# Patient Record
Sex: FEMALE | Race: WHITE | Employment: FULL TIME | URBAN - METROPOLITAN AREA
[De-identification: names, ages, dates, MRNs, and addresses within clinical notes are randomized per-mention and may not be internally consistent; named-entity substitution may affect disease eponyms.]

---

## 2017-01-17 DIAGNOSIS — M47.812 CERVICAL SPONDYLOSIS WITHOUT MYELOPATHY: ICD-10-CM

## 2017-01-17 DIAGNOSIS — G43.109 MIGRAINE WITH AURA AND WITHOUT STATUS MIGRAINOSUS, NOT INTRACTABLE: ICD-10-CM

## 2017-01-17 RX ORDER — RIZATRIPTAN BENZOATE 10 MG/1
10 TABLET ORAL
Qty: 36 TAB | Refills: 4 | Status: SHIPPED | OUTPATIENT
Start: 2017-01-17 | End: 2018-01-23 | Stop reason: SDUPTHER

## 2017-01-17 NOTE — TELEPHONE ENCOUNTER
Future Appointments  Date Time Provider Sergey Shipley   1/20/2017 9:40 AM Elliot Colby MD [address]                         Last Appointment My Department:  10/22/2015    Please advise of refill below. Requested Prescriptions     Pending Prescriptions Disp Refills    rizatriptan (MAXALT) 10 mg tablet 36 Tab 4     Sig: Take 1 Tab by mouth every eight (8) hours as needed for Migraine.

## 2017-01-17 NOTE — TELEPHONE ENCOUNTER
Patient would like refills for maxalt please send to [facility]

## 2017-01-20 ENCOUNTER — OFFICE VISIT (OUTPATIENT)
Dept: NEUROLOGY | Age: 47
End: 2017-01-20

## 2017-01-20 VITALS
BODY MASS INDEX: 27.16 KG/M2 | OXYGEN SATURATION: 96 % | WEIGHT: 163 LBS | HEIGHT: 65 IN | SYSTOLIC BLOOD PRESSURE: 108 MMHG | HEART RATE: 60 BPM | DIASTOLIC BLOOD PRESSURE: 62 MMHG | RESPIRATION RATE: 16 BRPM

## 2017-01-20 DIAGNOSIS — G43.109 MIGRAINE WITH AURA AND WITHOUT STATUS MIGRAINOSUS, NOT INTRACTABLE: Primary | ICD-10-CM

## 2017-01-20 DIAGNOSIS — M47.812 CERVICAL SPONDYLOSIS WITHOUT MYELOPATHY: ICD-10-CM

## 2017-01-20 RX ORDER — CHOLECALCIFEROL (VITAMIN D3) 125 MCG
CAPSULE ORAL
COMMUNITY

## 2017-01-20 RX ORDER — MINERAL OIL
180 ENEMA (ML) RECTAL
COMMUNITY

## 2017-01-20 RX ORDER — MULTIVIT WITH MINERALS/HERBS
1 TABLET ORAL DAILY
COMMUNITY

## 2017-01-20 NOTE — MR AVS SNAPSHOT
Visit Information Date & Time Provider Department Dept. Phone Encounter #  
 1/20/2017  9:40 AM Lavern Menjivar MD [facility] [phone] [id number] Follow-up Instructions Return in about 1 year (around 1/20/2018). Upcoming Health Maintenance Date Due Pneumococcal 19-64 Medium Risk (1 of 1 - PPSV23) 1/11/1987 DTaP/Tdap/Td series (1 - Tdap) 1/11/1989 PAP AKA CERVICAL CYTOLOGY 1/11/1989 INFLUENZA AGE 9 TO ADULT 8/1/2016 Allergies as of 1/20/2017  Review Complete On: 1/20/2017 By: Lavern Menjivar MD  
 No Known Allergies Current Immunizations  Never Reviewed No immunizations on file. Not reviewed this visit Vitals BP Pulse Resp Height(growth percentile) Weight(growth percentile) SpO2  
 108/62 60 16 5' 5\" (1.651 m) 163 lb (73.9 kg) 96% BMI OB Status Smoking Status 27.12 kg/m2 Hysterectomy Current Every Day Smoker Vitals History BMI and BSA Data Body Mass Index Body Surface Area  
 27.12 kg/m 2 1.84 m 2 Preferred Pharmacy Pharmacy Name Phone [address], [facility] [phone] Your Updated Medication List  
  
   
This list is accurate as of: 1/20/17 10:15 AM.  Always use your most recent med list.  
  
  
  
  
 B COMPLEX 1 tablet Generic drug:  b complex vitamins Take 1 Tab by mouth daily. fexofenadine 180 mg tablet Commonly known as:  Aliyah Ravens Take 180 mg by mouth daily. rizatriptan 10 mg tablet Commonly known as:  Kathlee Rise Take 1 Tab by mouth every eight (8) hours as needed for Migraine. topiramate 100 mg tablet Commonly known as:  TOPAMAX Take 2 Tabs by mouth daily. VITAMIN D3 2,000 unit Tab Generic drug:  cholecalciferol (vitamin D3) Take  by mouth. ZyrTEC 10 mg Cap Generic drug:  Cetirizine Take  by mouth. Follow-up Instructions Return in about 1 year (around 1/20/2018). Patient Instructions A Healthy Lifestyle: Care Instructions Your Care Instructions A healthy lifestyle can help you feel good, stay at a healthy weight, and have plenty of energy for both work and play. A healthy lifestyle is something you can share with your whole family. A healthy lifestyle also can lower your risk for serious health problems, such as high blood pressure, heart disease, and diabetes. You can follow a few steps listed below to improve your health and the health of your family. Follow-up care is a key part of your treatment and safety. Be sure to make and go to all appointments, and call your doctor if you are having problems. Its also a good idea to know your test results and keep a list of the medicines you take. How can you care for yourself at home? · Do not eat too much sugar, fat, or fast foods. You can still have dessert and treats now and then. The goal is moderation. · Start small to improve your eating habits. Pay attention to portion sizes, drink less juice and soda pop, and eat more fruits and vegetables. ¨ Eat a healthy amount of food. A 3-ounce serving of meat, for example, is about the size of a deck of cards. Fill the rest of your plate with vegetables and whole grains. ¨ Limit the amount of soda and sports drinks you have every day. Drink more water when you are thirsty. ¨ Eat at least 5 servings of fruits and vegetables every day. It may seem like a lot, but it is not hard to reach this goal. A serving or helping is 1 piece of fruit, 1 cup of vegetables, or 2 cups of leafy, raw vegetables. Have an apple or some carrot sticks as an afternoon snack instead of a candy bar. Try to have fruits and/or vegetables at every meal. 
· Make exercise part of your daily routine. You may want to start with simple activities, such as walking, bicycling, or slow swimming.  Try to be active 30 to 60 minutes every day. You do not need to do all 30 to 60 minutes all at once. For example, you can exercise 3 times a day for 10 or 20 minutes. Moderate exercise is safe for most people, but it is always a good idea to talk to your doctor before starting an exercise program. 
· Keep moving. Deandre Cluck the lawn, work in the garden, or sezmi. Take the stairs instead of the elevator at work. · If you smoke, quit. People who smoke have an increased risk for heart attack, stroke, cancer, and other lung illnesses. Quitting is hard, but there are ways to boost your chance of quitting tobacco for good. ¨ Use nicotine gum, patches, or lozenges. ¨ Ask your doctor about stop-smoking programs and medicines. ¨ Keep trying. In addition to reducing your risk of diseases in the future, you will notice some benefits soon after you stop using tobacco. If you have shortness of breath or asthma symptoms, they will likely get better within a few weeks after you quit. · Limit how much alcohol you drink. Moderate amounts of alcohol (up to 2 drinks a day for men, 1 drink a day for women) are okay. But drinking too much can lead to liver problems, high blood pressure, and other health problems. Family health If you have a family, there are many things you can do together to improve your health. · Eat meals together as a family as often as possible. · Eat healthy foods. This includes fruits, vegetables, lean meats and dairy, and whole grains. · Include your family in your fitness plan. Most people think of activities such as jogging or tennis as the way to fitness, but there are many ways you and your family can be more active. Anything that makes you breathe hard and gets your heart pumping is exercise. Here are some tips: 
¨ Walk to do errands or to take your child to school or the bus. ¨ Go for a family bike ride after dinner instead of watching TV. Where can you learn more? Go to http://clarence-woo.info/. Enter F938 in the search box to learn more about \"A Healthy Lifestyle: Care Instructions. \" Current as of: July 26, 2016 Content Version: 11.1 © 3059-3424 [employer] (formerly [employer]). Care instructions adapted under license by [employer] (which disclaims liability or warranty for this information). If you have questions about a medical condition or this instruction, always ask your healthcare professional. [address] any warranty or liability for your use of this information. Introducing \[facility] [place] [facility]\"" & HEALTH SERVICES! Veronica Navas introduces Panono patient portal. Now you can access parts of your medical record, email your doctor's office, and request medication refills online. 1. In your internet browser, go to [email or web address]. [employer] 2. Click on the First Time User? Click Here link in the Sign In box. You will see the New Member Sign Up page. 3. Enter your Panono Access Code exactly as it appears below. You will not need to use this code after youve completed the sign-up process. If you do not sign up before the expiration date, you must request a new code. · Panono Access Code: [email or web address] Expires: 4/20/2017 10:04 AM 
 
4. Enter the last four digits of your Social Security Number (xxxx) and Date of Birth (mm/dd/yyyy) as indicated and click Submit. You will be taken to the next sign-up page. 5. Create a Panono ID. This will be your Panono login ID and cannot be changed, so think of one that is secure and easy to remember. 6. Create a Panono password. You can change your password at any time. 7. Enter your Password Reset Question and Answer. This can be used at a later time if you forget your password. 8. Enter your e-mail address. You will receive e-mail notification when new information is available in [address] 9. Click Sign Up.  You can now view and download portions of your medical record. 10. Click the Download Summary menu link to download a portable copy of your medical information. If you have questions, please visit the Frequently Asked Questions section of the EL[employer] website. Remember, [employer] is NOT to be used for urgent needs. For medical emergencies, dial 911. Now available from your iPhone and Android! Please provide this summary of care documentation to your next provider. Your primary care clinician is listed as Ivone Newman. If you have any questions after today's visit, please call [phone].

## 2017-01-20 NOTE — PATIENT INSTRUCTIONS

## 2017-01-20 NOTE — LETTER
1/20/2017 9:22 PM 
 
Patient:  Saige Lopez YOB: 1970 Date of Visit: 1/20/2017 Dear No Recipients: Thank you for referring Ms. Saige Lopez to me for evaluation/treatment. Below are the relevant portions of my assessment and plan of care. Consult Subjective:  
 
Saige Lopez is a 50 y. o.R handed white female seen for evaluation of migraine headaches. She still has headaches, but is maintaining control with maxalt p.r.n. She is also on Topamax 200 mg q. day. She has had more stressed recently, with her job which frequently causes headaches. On current medication she is definitely improved from her previous history. Headaches are bilateral throbbing associated with nausea blurred vision and photophobia and she has to lie down in dark place and sleep if severe. She has had normal MRI of brain in past and has had no new focal weakness sensory loss or visual changes or other new neuro problems Her neck pain is stable with exercise and OTC meds. She has had no unusual fever, head trauma, meningismus, new focal weakness or sensory loss, visual changes gait changes or cognitive problems, and denies any increased stress anxiety or tension. Complete review of systems and symptoms was negative for any new medical problems or illnesses recently. Past Medical History Diagnosis Date  Headache(784.0) Past Surgical History Procedure Laterality Date  Hx hysterectomy Family History Problem Relation Age of Onset  Cancer Mother  Heart Disease Father  Diabetes Father  Migraines Sister Social History Substance Use Topics  Smoking status: Current Every Day Smoker Packs/day: 0.50 Years: 5.00 Types: Cigarettes  Smokeless tobacco: Never Used  Alcohol use No  
   
Current Outpatient Prescriptions Medication Sig Dispense Refill  fexofenadine (ALLEGRA) 180 mg tablet Take 180 mg by mouth daily.  cholecalciferol, vitamin D3, (VITAMIN D3) 2,000 unit tab Take  by mouth.  b complex vitamins (B COMPLEX 1) tablet Take 1 Tab by mouth daily.  rizatriptan (MAXALT) 10 mg tablet Take 1 Tab by mouth every eight (8) hours as needed for Migraine. 36 Tab 4  
 topiramate (TOPAMAX) 100 mg tablet Take 2 Tabs by mouth daily. 180 Tab 5  Cetirizine (ZYRTEC) 10 mg cap Take  by mouth. No Known Allergies Review of Systems: A comprehensive review of systems was negative except for: Constitutional: positive for fatigue and malaise Musculoskeletal: positive for myalgias, arthralgias and stiff joints Neurological: positive for headaches and paresthesia Behvioral/Psych: positive for anxiety and depression Objective: I 
 
 
NEUROLOGICAL EXAM: 
 
Appearance: The patient is well developed, well nourished, provides a coherent history and is in no acute distress. Mental Status: Oriented to time, place and person and the president, cognitive function is normal and speech is fluent without aphasia. Mood and affect appropriate. Cranial Nerves:   Intact visual fields. Fundi are benign. ADELAIDA, EOM's full, no nystagmus, no ptosis. Facial sensation is normal. Corneal reflexes are into tested. Facial movement is symmetric. Hearing is normal bilaterally. Palate is midline with normal sternocleidomastoid and trapezius muscles are normal. Tongue is midline. Neck is supple without bruits or meningismus Motor:  5/5 strength in upper and lower proximal and distal muscles. Normal bulk and tone. No fasciculations. Reflexes:   Deep tendon reflexes 2+/4 and symmetrical. 
No Babinski or clonus present Sensory:   Normal to touch, pinprick and vibration. DSS is intact Gait:  Normal gait. Tremor:   No tremor noted. Cerebellar:  No cerebellar signs present. Neurovascular:  Normal heart sounds and regular rhythm, peripheral pulses intact, and no carotid bruits. Assessment: Migraines persistent and controlled by medication Neck pain and spondylosis stable on current treatment Plan:  
 
Patient doing well on current therapy, no changes will be made Pt told to start exercise and MVI and her Vit D more regularly Pt told to quit smoking Patient will call if any problem in the interim Follow up in 12months or earlier if needed Signed By: Jessa Solomon MD   
 January 20, 2017 This note will not be viewable in [address] If you have questions, please do not hesitate to call me. I look forward to following Ms. Jessica Gordon along with you. Sincerely, Jessa Solomon MD

## 2017-01-21 NOTE — PROGRESS NOTES
Consult    Subjective:     Karthik Amaral is a 50 y. o.R handed white female seen for evaluation of migraine headaches. She still has headaches, but is maintaining control with maxalt p.r.n. She is also on Topamax 200 mg q. day. She has had more stressed recently, with her job which frequently causes headaches. On current medication she is definitely improved from her previous history. Headaches are bilateral throbbing associated with nausea blurred vision and photophobia and she has to lie down in dark place and sleep if severe. She has had normal MRI of brain in past and has had no new focal weakness sensory loss or visual changes or other new neuro problems  Her neck pain is stable with exercise and OTC meds. She has had no unusual fever, head trauma, meningismus, new focal weakness or sensory loss, visual changes gait changes or cognitive problems, and denies any increased stress anxiety or tension. Complete review of systems and symptoms was negative for any new medical problems or illnesses recently. Past Medical History   Diagnosis Date    Headache(784.0)       Past Surgical History   Procedure Laterality Date    Hx hysterectomy       Family History   Problem Relation Age of Onset    Cancer Mother     Heart Disease Father     Diabetes Father     Migraines Sister       Social History   Substance Use Topics    Smoking status: Current Every Day Smoker     Packs/day: 0.50     Years: 5.00     Types: Cigarettes    Smokeless tobacco: Never Used    Alcohol use No       Current Outpatient Prescriptions   Medication Sig Dispense Refill    fexofenadine (ALLEGRA) 180 mg tablet Take 180 mg by mouth daily.  cholecalciferol, vitamin D3, (VITAMIN D3) 2,000 unit tab Take  by mouth.  b complex vitamins (B COMPLEX 1) tablet Take 1 Tab by mouth daily.  rizatriptan (MAXALT) 10 mg tablet Take 1 Tab by mouth every eight (8) hours as needed for Migraine.  36 Tab 4    topiramate (TOPAMAX) 100 mg tablet Take 2 Tabs by mouth daily. 180 Tab 5    Cetirizine (ZYRTEC) 10 mg cap Take  by mouth. No Known Allergies     Review of Systems:  A comprehensive review of systems was negative except for: Constitutional: positive for fatigue and malaise  Musculoskeletal: positive for myalgias, arthralgias and stiff joints  Neurological: positive for headaches and paresthesia  Behvioral/Psych: positive for anxiety and depression     Objective:     I      NEUROLOGICAL EXAM:    Appearance: The patient is well developed, well nourished, provides a coherent history and is in no acute distress. Mental Status: Oriented to time, place and person and the president, cognitive function is normal and speech is fluent without aphasia. Mood and affect appropriate. Cranial Nerves:   Intact visual fields. Fundi are benign. ADELAIDA, EOM's full, no nystagmus, no ptosis. Facial sensation is normal. Corneal reflexes are into tested. Facial movement is symmetric. Hearing is normal bilaterally. Palate is midline with normal sternocleidomastoid and trapezius muscles are normal. Tongue is midline. Neck is supple without bruits or meningismus   Motor:  5/5 strength in upper and lower proximal and distal muscles. Normal bulk and tone. No fasciculations. Reflexes:   Deep tendon reflexes 2+/4 and symmetrical.  No Babinski or clonus present   Sensory:   Normal to touch, pinprick and vibration. DSS is intact   Gait:  Normal gait. Tremor:   No tremor noted. Cerebellar:  No cerebellar signs present. Neurovascular:  Normal heart sounds and regular rhythm, peripheral pulses intact, and no carotid bruits.            Assessment:     Migraines persistent and controlled by medication  Neck pain and spondylosis stable on current treatment    Plan:     Patient doing well on current therapy, no changes will be made  Pt told to start exercise and MVI and her Vit D more regularly   Pt told to quit smoking  Patient will call if any problem in the interim  Follow up in 12months or earlier if needed    Signed By: Kathya Jaime MD     January 20, 2017       This note will not be viewable in [address]

## 2018-01-23 ENCOUNTER — OFFICE VISIT (OUTPATIENT)
Dept: NEUROLOGY | Age: 48
End: 2018-01-23

## 2018-01-23 VITALS
HEART RATE: 78 BPM | OXYGEN SATURATION: 97 % | BODY MASS INDEX: 30.66 KG/M2 | WEIGHT: 184 LBS | SYSTOLIC BLOOD PRESSURE: 124 MMHG | DIASTOLIC BLOOD PRESSURE: 84 MMHG | HEIGHT: 65 IN

## 2018-01-23 DIAGNOSIS — M47.812 CERVICAL SPONDYLOSIS WITHOUT MYELOPATHY: ICD-10-CM

## 2018-01-23 DIAGNOSIS — G43.109 MIGRAINE WITH AURA AND WITHOUT STATUS MIGRAINOSUS, NOT INTRACTABLE: Primary | ICD-10-CM

## 2018-01-23 RX ORDER — TOPIRAMATE 100 MG/1
200 TABLET, FILM COATED ORAL DAILY
Qty: 180 TAB | Refills: 5 | Status: SHIPPED | OUTPATIENT
Start: 2018-01-23 | End: 2019-06-05 | Stop reason: ALTCHOICE

## 2018-01-23 RX ORDER — RIZATRIPTAN BENZOATE 10 MG/1
10 TABLET ORAL
Qty: 36 TAB | Refills: 4 | Status: SHIPPED | OUTPATIENT
Start: 2018-01-23 | End: 2019-06-05 | Stop reason: SDUPTHER

## 2018-01-23 NOTE — PATIENT INSTRUCTIONS
Please be advised there is a $25 fee for all paperwork to be completed from our  providers. This is to be paid by the patient prior to picking up the completed forms. A Healthy Lifestyle: Care Instructions  Your Care Instructions    A healthy lifestyle can help you feel good, stay at a healthy weight, and have plenty of energy for both work and play. A healthy lifestyle is something you can share with your whole family. A healthy lifestyle also can lower your risk for serious health problems, such as high blood pressure, heart disease, and diabetes. You can follow a few steps listed below to improve your health and the health of your family. Follow-up care is a key part of your treatment and safety. Be sure to make and go to all appointments, and call your doctor if you are having problems. It's also a good idea to know your test results and keep a list of the medicines you take. How can you care for yourself at home? · Do not eat too much sugar, fat, or fast foods. You can still have dessert and treats now and then. The goal is moderation. · Start small to improve your eating habits. Pay attention to portion sizes, drink less juice and soda pop, and eat more fruits and vegetables. ¨ Eat a healthy amount of food. A 3-ounce serving of meat, for example, is about the size of a deck of cards. Fill the rest of your plate with vegetables and whole grains. ¨ Limit the amount of soda and sports drinks you have every day. Drink more water when you are thirsty. ¨ Eat at least 5 servings of fruits and vegetables every day. It may seem like a lot, but it is not hard to reach this goal. A serving or helping is 1 piece of fruit, 1 cup of vegetables, or 2 cups of leafy, raw vegetables. Have an apple or some carrot sticks as an afternoon snack instead of a candy bar. Try to have fruits and/or vegetables at every meal.  · Make exercise part of your daily routine.  You may want to start with simple activities, such as walking, bicycling, or slow swimming. Try to be active 30 to 60 minutes every day. You do not need to do all 30 to 60 minutes all at once. For example, you can exercise 3 times a day for 10 or 20 minutes. Moderate exercise is safe for most people, but it is always a good idea to talk to your doctor before starting an exercise program.  · Keep moving. [place] Guzmanor the lawn, work in the garden, or [employer]. Take the stairs instead of the elevator at work. · If you smoke, quit. People who smoke have an increased risk for heart attack, stroke, cancer, and other lung illnesses. Quitting is hard, but there are ways to boost your chance of quitting tobacco for good. ¨ Use nicotine gum, patches, or lozenges. ¨ Ask your doctor about stop-smoking programs and medicines. ¨ Keep trying. In addition to reducing your risk of diseases in the future, you will notice some benefits soon after you stop using tobacco. If you have shortness of breath or asthma symptoms, they will likely get better within a few weeks after you quit. · Limit how much alcohol you drink. Moderate amounts of alcohol (up to 2 drinks a day for men, 1 drink a day for women) are okay. But drinking too much can lead to liver problems, high blood pressure, and other health problems. Family health  If you have a family, there are many things you can do together to improve your health. · Eat meals together as a family as often as possible. · Eat healthy foods. This includes fruits, vegetables, lean meats and dairy, and whole grains. · Include your family in your fitness plan. Most people think of activities such as jogging or tennis as the way to fitness, but there are many ways you and your family can be more active. Anything that makes you breathe hard and gets your heart pumping is exercise. Here are some tips:  ¨ Walk to do errands or to take your child to school or the bus. ¨ Go for a family bike ride after dinner instead of watching TV.   Where can you learn more? Go to http://clarence-woo.info/. Enter H425 in the search box to learn more about \"A Healthy Lifestyle: Care Instructions. \"  Current as of: May 12, 2017  Content Version: 11.4  © 9657-5410 [employer]. Care instructions adapted under license by [employer] (which disclaims liability or warranty for this information). If you have questions about a medical condition or this instruction, always ask your healthcare professional. [address] any warranty or liability for your use of this information.

## 2018-01-23 NOTE — LETTER
1/23/2018 5:26 PM 
 
Patient:  Drake Juan YOB: 1970 Date of Visit: 1/23/2018 Dear No Recipients: Thank you for referring Ms. Drake Juan to me for evaluation/treatment. Below are the relevant portions of my assessment and plan of care. Consult Subjective:  
 
Drake Juan is a 46 y. o.R handed white female seen for evaluation of migraine headaches at the request of Dr. Gurmeet Michelle. She still has headaches, but is maintaining control with maxalt p.r.n. She is also on Topamax 100 mg twice a day. She wants to know whether she could decrease to medication, and I told her to cut her first dose down to 50 mg, and continue the 100 mg at night, and do that for 1 month and then go down to 50 mg twice a day thereafter and call us in 2 months time and let us know how she is doing. She gets about 5-6 headaches a month, and the Maxalt usually relieves the headaches when she does get them so she may not need preventive medication anymore. Stress is the most common precipitating factor for her headaches. On current medication she is definitely improved from her previous history. Headaches are bilateral throbbing associated with nausea blurred vision and photophobia and she has to lie down in dark place and sleep if severe. She has had normal MRI of brain in past and has had no new focal weakness sensory loss or visual changes or other new neuro problems Her neck pain is stable with exercise and OTC meds. She has had no unusual fever, head trauma, meningismus, new focal weakness or sensory loss, visual changes gait changes or cognitive problems, and denies any increased stress anxiety or tension. Complete review of systems and symptoms was negative for any new medical problems or illnesses recently. Past Medical History:  
Diagnosis Date  Headache(784.0) Past Surgical History:  
Procedure Laterality Date  HX HYSTERECTOMY Family History Problem Relation Age of Onset  Cancer Mother  Heart Disease Father  Diabetes Father  Migraines Sister Social History Substance Use Topics  Smoking status: Current Every Day Smoker Years: 5.00 Types: Cigarettes  Smokeless tobacco: Never Used Comment: currently trying to quit, down to 3-5 cigarettes per day  Alcohol use No  
   
Current Outpatient Prescriptions Medication Sig Dispense Refill Jos Patterson, [facility] Supplement  topiramate (TOPAMAX) 100 mg tablet Take 2 Tabs by mouth daily. 180 Tab 5  
 rizatriptan (MAXALT) 10 mg tablet Take 1 Tab by mouth every eight (8) hours as needed for Migraine. 36 Tab 4  
 fexofenadine (ALLEGRA) 180 mg tablet Take 180 mg by mouth daily.  cholecalciferol, vitamin D3, (VITAMIN D3) 2,000 unit tab Take  by mouth.  b complex vitamins (B COMPLEX 1) tablet Take 1 Tab by mouth daily.  Cetirizine (ZYRTEC) 10 mg cap Take  by mouth. No Known Allergies Review of Systems: A comprehensive review of systems was negative except for: Constitutional: positive for fatigue and malaise Musculoskeletal: positive for myalgias, arthralgias and stiff joints Neurological: positive for headaches and paresthesia Behvioral/Psych: positive for anxiety and depression Objective: I 
 
 
NEUROLOGICAL EXAM: 
 
Appearance: The patient is well developed, well nourished, provides a coherent history and is in no acute distress. Mental Status: Oriented to time, place and person and the president, cognitive function is normal and speech is fluent without aphasia. Mood and affect appropriate. Cranial Nerves:   Intact visual fields. Fundi are benign. ADELIADA, EOM's full, no nystagmus, no ptosis. Facial sensation is normal. Corneal reflexes are into tested. Facial movement is symmetric. Hearing is normal bilaterally. Palate is midline with normal sternocleidomastoid and trapezius muscles are normal. Tongue is midline. Neck is supple without bruits or meningismus Motor:  5/5 strength in upper and lower proximal and distal muscles. Normal bulk and tone. No fasciculations. Reflexes:   Deep tendon reflexes 2+/4 and symmetrical. 
No Babinski or clonus present Sensory:   Normal to touch, pinprick and vibration. DSS is intact Gait:  Normal gait. Tremor:   No tremor noted. Cerebellar:  No cerebellar signs present. Neurovascular:  Normal heart sounds and regular rhythm, peripheral pulses intact, and no carotid bruits. Assessment:  
 
Plan:  
 
Migraines persistent and controlled by medication Neck pain and spondylosis stable on current treatment Patient would like to taper down her Topamax and we will do that 50 mg intervals for 1 month ×2, and then she will be down on 50 mg twice a day and will call us then and let us know how she is doing or earlier if she has a problem Her medications were reviewed and renewed today for the patient. Patient doing well on current therapy, no other changes will be made Pt told to start exercise and MVI and her Vit D more regularly Pt told to quit smoking Patient will call if any problem in the interim Follow up in 12months or earlier if needed Signed By: Edith Bergeron MD   
 January 23, 2018 This note will not be viewable in [address] If you have questions, please do not hesitate to call me. I look forward to following Ms. Beatrice Clements along with you. Sincerely, Edith Bergeron MD

## 2018-01-23 NOTE — PROGRESS NOTES
Consult    Subjective:     Ashley Guzman is a 46 y. o.R handed white female seen for evaluation of migraine headaches at the request of Dr. Rocky Piedra. She still has headaches, but is maintaining control with maxalt p.r.n. She is also on Topamax 100 mg twice a day. She wants to know whether she could decrease to medication, and I told her to cut her first dose down to 50 mg, and continue the 100 mg at night, and do that for 1 month and then go down to 50 mg twice a day thereafter and call us in 2 months time and let us know how she is doing. She gets about 5-6 headaches a month, and the Maxalt usually relieves the headaches when she does get them so she may not need preventive medication anymore. Stress is the most common precipitating factor for her headaches. On current medication she is definitely improved from her previous history. Headaches are bilateral throbbing associated with nausea blurred vision and photophobia and she has to lie down in dark place and sleep if severe. She has had normal MRI of brain in past and has had no new focal weakness sensory loss or visual changes or other new neuro problems  Her neck pain is stable with exercise and OTC meds. She has had no unusual fever, head trauma, meningismus, new focal weakness or sensory loss, visual changes gait changes or cognitive problems, and denies any increased stress anxiety or tension. Complete review of systems and symptoms was negative for any new medical problems or illnesses recently.     Past Medical History:   Diagnosis Date    Headache(784.0)       Past Surgical History:   Procedure Laterality Date    HX HYSTERECTOMY       Family History   Problem Relation Age of Onset    Cancer Mother     Heart Disease Father     Diabetes Father     Migraines Sister       Social History   Substance Use Topics    Smoking status: Current Every Day Smoker     Years: 5.00     Types: Cigarettes    Smokeless tobacco: Never Used      Comment: currently trying to quit, down to 3-5 cigarettes per day    Alcohol use No       Current Outpatient Prescriptions   Medication Sig Dispense Refill    OTHER,NON-FORMULARY, [employer] Supplement      topiramate (TOPAMAX) 100 mg tablet Take 2 Tabs by mouth daily. 180 Tab 5    rizatriptan (MAXALT) 10 mg tablet Take 1 Tab by mouth every eight (8) hours as needed for Migraine. 36 Tab 4    fexofenadine (ALLEGRA) 180 mg tablet Take 180 mg by mouth daily.  cholecalciferol, vitamin D3, (VITAMIN D3) 2,000 unit tab Take  by mouth.  b complex vitamins (B COMPLEX 1) tablet Take 1 Tab by mouth daily.  Cetirizine (ZYRTEC) 10 mg cap Take  by mouth. No Known Allergies     Review of Systems:  A comprehensive review of systems was negative except for: Constitutional: positive for fatigue and malaise  Musculoskeletal: positive for myalgias, arthralgias and stiff joints  Neurological: positive for headaches and paresthesia  Behvioral/Psych: positive for anxiety and depression     Objective:     I      NEUROLOGICAL EXAM:    Appearance: The patient is well developed, well nourished, provides a coherent history and is in no acute distress. Mental Status: Oriented to time, place and person and the president, cognitive function is normal and speech is fluent without aphasia. Mood and affect appropriate. Cranial Nerves:   Intact visual fields. Fundi are benign. ADELAIDA, EOM's full, no nystagmus, no ptosis. Facial sensation is normal. Corneal reflexes are into tested. Facial movement is symmetric. Hearing is normal bilaterally. Palate is midline with normal sternocleidomastoid and trapezius muscles are normal. Tongue is midline. Neck is supple without bruits or meningismus   Motor:  5/5 strength in upper and lower proximal and distal muscles. Normal bulk and tone. No fasciculations.    Reflexes:   Deep tendon reflexes 2+/4 and symmetrical.  No Babinski or clonus present   Sensory:   Normal to touch, pinprick and vibration. DSS is intact   Gait:  Normal gait. Tremor:   No tremor noted. Cerebellar:  No cerebellar signs present. Neurovascular:  Normal heart sounds and regular rhythm, peripheral pulses intact, and no carotid bruits. Assessment:     Plan:     Migraines persistent and controlled by medication  Neck pain and spondylosis stable on current treatment  Patient would like to taper down her Topamax and we will do that 50 mg intervals for 1 month ×2, and then she will be down on 50 mg twice a day and will call us then and let us know how she is doing or earlier if she has a problem  Her medications were reviewed and renewed today for the patient. Patient doing well on current therapy, no other changes will be made  Pt told to start exercise and MVI and her Vit D more regularly   Pt told to quit smoking  Patient will call if any problem in the interim  Follow up in 12months or earlier if needed    Signed By: Nilesh Myers MD     January 23, 2018       This note will not be viewable in [address]

## 2018-01-23 NOTE — MR AVS SNAPSHOT
[address], 
[address], [address] [facility] 
[phone] Patient: Watson Ferrer MRN: [id number] UIM:[id number] Visit Information Date & Time Provider Department Dept. Phone Encounter #  
 1/23/2018  8:40 AM Conrad Woodward MD [facility] [phone] [id number] Follow-up Instructions Return in about 1 year (around 1/23/2019). Upcoming Health Maintenance Date Due Pneumococcal 19-64 Medium Risk (1 of 1 - PPSV23) 1/11/1987 DTaP/Tdap/Td series (1 - Tdap) 1/11/1989 PAP AKA CERVICAL CYTOLOGY 1/11/1989 Influenza Age 5 to Adult 8/1/2017 BREAST CANCER SCRN MAMMOGRAM 1/11/2018 FOBT Q 1 YEAR AGE 50-75 1/11/2018 Allergies as of 1/23/2018  Review Complete On: 1/23/2018 By: Conrad Woodward MD  
 No Known Allergies Current Immunizations  Never Reviewed No immunizations on file. Not reviewed this visit You Were Diagnosed With   
  
 Codes Comments Migraine with aura and without status migrainosus, not intractable    -  Primary ICD-10-CM: G43.109 ICD-9-CM: 346.00 Cervical spondylosis without myelopathy     ICD-10-CM: F87.218 ICD-9-CM: 721.0 Vitals BP Pulse Height(growth percentile) Weight(growth percentile) SpO2 BMI  
 124/84 78 5' 5\" (1.651 m) 184 lb (83.5 kg) 97% 30.62 kg/m2 OB Status Smoking Status Hysterectomy Current Every Day Smoker BMI and BSA Data Body Mass Index Body Surface Area  
 30.62 kg/m 2 1.96 m 2 Preferred Pharmacy Pharmacy Name Phone [address] [facility] [phone] Your Updated Medication List  
  
   
This list is accurate as of: 1/23/18  9:44 AM.  Always use your most recent med list.  
  
  
  
  
 B COMPLEX 1 tablet Generic drug:  b complex vitamins Take 1 Tab by mouth daily. fexofenadine 180 mg tablet Commonly known as:  Luis Nicolasa Take 180 mg by mouth daily. OTHER(NON-FORMULARY) [employer] Supplement  
  
 rizatriptan 10 mg tablet Commonly known as:  Gonsalo Pew Take 1 Tab by mouth every eight (8) hours as needed for Migraine. topiramate 100 mg tablet Commonly known as:  TOPAMAX Take 2 Tabs by mouth daily. VITAMIN D3 2,000 unit Tab Generic drug:  cholecalciferol (vitamin D3) Take  by mouth. ZyrTEC 10 mg Cap Generic drug:  Cetirizine Take  by mouth. Prescriptions Sent to Pharmacy Refills  
 topiramate (TOPAMAX) 100 mg tablet 5 Sig: Take 2 Tabs by mouth daily. Class: Normal  
 Pharmacy: [address], [address] [place] [address] Ph #: [phone] Route: Oral  
 rizatriptan (MAXALT) 10 mg tablet 4 Sig: Take 1 Tab by mouth every eight (8) hours as needed for Migraine. Class: Normal  
 Pharmacy: [address], [address] Ph #: [phone] Route: Oral  
  
Follow-up Instructions Return in about 1 year (around 1/23/2019). Patient Instructions Please be advised there is a $25 fee for all paperwork to be completed from our  providers. This is to be paid by the patient prior to picking up the completed forms. A Healthy Lifestyle: Care Instructions Your Care Instructions A healthy lifestyle can help you feel good, stay at a healthy weight, and have plenty of energy for both work and play. A healthy lifestyle is something you can share with your whole family. A healthy lifestyle also can lower your risk for serious health problems, such as high blood pressure, heart disease, and diabetes. You can follow a few steps listed below to improve your health and the health of your family. Follow-up care is a key part of your treatment and safety.  Be sure to make and go to all appointments, and call your doctor if you are having problems. It's also a good idea to know your test results and keep a list of the medicines you take. How can you care for yourself at home? · Do not eat too much sugar, fat, or fast foods. You can still have dessert and treats now and then. The goal is moderation. · Start small to improve your eating habits. Pay attention to portion sizes, drink less juice and soda pop, and eat more fruits and vegetables. ¨ Eat a healthy amount of food. A 3-ounce serving of meat, for example, is about the size of a deck of cards. Fill the rest of your plate with vegetables and whole grains. ¨ Limit the amount of soda and sports drinks you have every day. Drink more water when you are thirsty. ¨ Eat at least 5 servings of fruits and vegetables every day. It may seem like a lot, but it is not hard to reach this goal. A serving or helping is 1 piece of fruit, 1 cup of vegetables, or 2 cups of leafy, raw vegetables. Have an apple or some carrot sticks as an afternoon snack instead of a candy bar. Try to have fruits and/or vegetables at every meal. 
· Make exercise part of your daily routine. You may want to start with simple activities, such as walking, bicycling, or slow swimming. Try to be active 30 to 60 minutes every day. You do not need to do all 30 to 60 minutes all at once. For example, you can exercise 3 times a day for 10 or 20 minutes. Moderate exercise is safe for most people, but it is always a good idea to talk to your doctor before starting an exercise program. 
· Keep moving. Obed Terry the lawn, work in the garden, or servtag. Take the stairs instead of the elevator at work. · If you smoke, quit. People who smoke have an increased risk for heart attack, stroke, cancer, and other lung illnesses. Quitting is hard, but there are ways to boost your chance of quitting tobacco for good. ¨ Use nicotine gum, patches, or lozenges. ¨ Ask your doctor about stop-smoking programs and medicines. ¨ Keep trying. In addition to reducing your risk of diseases in the future, you will notice some benefits soon after you stop using tobacco. If you have shortness of breath or asthma symptoms, they will likely get better within a few weeks after you quit. · Limit how much alcohol you drink. Moderate amounts of alcohol (up to 2 drinks a day for men, 1 drink a day for women) are okay. But drinking too much can lead to liver problems, high blood pressure, and other health problems. Family health If you have a family, there are many things you can do together to improve your health. · Eat meals together as a family as often as possible. · Eat healthy foods. This includes fruits, vegetables, lean meats and dairy, and whole grains. · Include your family in your fitness plan. Most people think of activities such as jogging or tennis as the way to fitness, but there are many ways you and your family can be more active. Anything that makes you breathe hard and gets your heart pumping is exercise. Here are some tips: 
¨ Walk to do errands or to take your child to school or the bus. ¨ Go for a family bike ride after dinner instead of watching TV. Where can you learn more? Go to http://clarence-woo.info/. Enter C017 in the search box to learn more about \"A Healthy Lifestyle: Care Instructions. \" Current as of: May 12, 2017 Content Version: 11.4 © 2613-8983 Healthwise, Incorporated. Care instructions adapted under license by [employer] (which disclaims liability or warranty for this information). If you have questions about a medical condition or this instruction, always ask your healthcare professional. [place] [address] any warranty or liability for your use of this information. Introducing \[facility] [place] [facility]\"" & HEALTH SERVICES! Ayse Valdez introduces [facility] patient portal. Now you can access parts of your medical record, email your doctor's office, and request medication refills online. 1. In your internet browser, go to [email or web address] 2. Click on the First Time User? Click Here link in the Sign In box. You will see the New Member Sign Up page. 3. Enter your Blastbeat Access Code exactly as it appears below. You will not need to use this code after youve completed the sign-up process. If you do not sign up before the expiration date, you must request a new code. · Blastbeat Access Code: [email or web address] Expires: 4/23/2018  8:59 AM 
 
4. Enter the last four digits of your Social Security Number (xxxx) and Date of Birth (mm/dd/yyyy) as indicated and click Submit. You will be taken to the next sign-up page. 5. Create a [employer]Wakt ID. This will be your Blastbeat login ID and cannot be changed, so think of one that is secure and easy to remember. 6. Create a Blastbeat password. You can change your password at any time. 7. Enter your Password Reset Question and Answer. This can be used at a later time if you forget your password. 8. Enter your e-mail address. You will receive e-mail notification when new information is available in [address] 9. Click Sign Up. You can now view and download portions of your medical record. 10. Click the Download Summary menu link to download a portable copy of your medical information. If you have questions, please visit the Frequently Asked Questions section of the Blastbeat website. Remember, Blastbeat is NOT to be used for urgent needs. For medical emergencies, dial 911. Now available from your iPhone and Android! Please provide this summary of care documentation to your next provider. Your primary care clinician is listed as Etelvina Newman. If you have any questions after today's visit, please call [phone].

## 2018-02-25 DIAGNOSIS — M47.812 CERVICAL SPONDYLOSIS WITHOUT MYELOPATHY: ICD-10-CM

## 2018-02-25 DIAGNOSIS — G43.109 MIGRAINE WITH AURA AND WITHOUT STATUS MIGRAINOSUS, NOT INTRACTABLE: ICD-10-CM

## 2018-02-25 RX ORDER — RIZATRIPTAN BENZOATE 10 MG/1
TABLET ORAL
Qty: 36 TAB | Refills: 4 | Status: SHIPPED | OUTPATIENT
Start: 2018-02-25 | End: 2019-06-05 | Stop reason: ALTCHOICE

## 2019-06-05 ENCOUNTER — OFFICE VISIT (OUTPATIENT)
Dept: NEUROLOGY | Age: 49
End: 2019-06-05

## 2019-06-05 VITALS
RESPIRATION RATE: 12 BRPM | HEIGHT: 65 IN | SYSTOLIC BLOOD PRESSURE: 116 MMHG | WEIGHT: 211 LBS | OXYGEN SATURATION: 98 % | BODY MASS INDEX: 35.16 KG/M2 | HEART RATE: 70 BPM | DIASTOLIC BLOOD PRESSURE: 76 MMHG

## 2019-06-05 DIAGNOSIS — M47.812 CERVICAL SPONDYLOSIS WITHOUT MYELOPATHY: ICD-10-CM

## 2019-06-05 DIAGNOSIS — G43.109 MIGRAINE WITH AURA AND WITHOUT STATUS MIGRAINOSUS, NOT INTRACTABLE: Primary | ICD-10-CM

## 2019-06-05 PROBLEM — E66.01 SEVERE OBESITY (HCC): Status: ACTIVE | Noted: 2019-06-05

## 2019-06-05 RX ORDER — CHOLECALCIFEROL (VITAMIN D3) 125 MCG
300 CAPSULE ORAL DAILY
COMMUNITY

## 2019-06-05 RX ORDER — LANOLIN ALCOHOL/MO/W.PET/CERES
400 CREAM (GRAM) TOPICAL DAILY
COMMUNITY

## 2019-06-05 RX ORDER — RIZATRIPTAN BENZOATE 10 MG/1
10 TABLET ORAL
Qty: 36 TAB | Refills: 4 | Status: SHIPPED | OUTPATIENT
Start: 2019-06-05 | End: 2020-04-28

## 2019-06-05 NOTE — PATIENT INSTRUCTIONS
A Healthy Lifestyle: Care Instructions  Your Care Instructions    A healthy lifestyle can help you feel good, stay at a healthy weight, and have plenty of energy for both work and play. A healthy lifestyle is something you can share with your whole family. A healthy lifestyle also can lower your risk for serious health problems, such as high blood pressure, heart disease, and diabetes. You can follow a few steps listed below to improve your health and the health of your family. Follow-up care is a key part of your treatment and safety. Be sure to make and go to all appointments, and call your doctor if you are having problems. It's also a good idea to know your test results and keep a list of the medicines you take. How can you care for yourself at home? · Do not eat too much sugar, fat, or fast foods. You can still have dessert and treats now and then. The goal is moderation. · Start small to improve your eating habits. Pay attention to portion sizes, drink less juice and soda pop, and eat more fruits and vegetables. ? Eat a healthy amount of food. A 3-ounce serving of meat, for example, is about the size of a deck of cards. Fill the rest of your plate with vegetables and whole grains. ? Limit the amount of soda and sports drinks you have every day. Drink more water when you are thirsty. ? Eat at least 5 servings of fruits and vegetables every day. It may seem like a lot, but it is not hard to reach this goal. A serving or helping is 1 piece of fruit, 1 cup of vegetables, or 2 cups of leafy, raw vegetables. Have an apple or some carrot sticks as an afternoon snack instead of a candy bar. Try to have fruits and/or vegetables at every meal.  · Make exercise part of your daily routine. You may want to start with simple activities, such as walking, bicycling, or slow swimming. Try to be active 30 to 60 minutes every day. You do not need to do all 30 to 60 minutes all at once.  For example, you can exercise 3 times a day for 10 or 20 minutes. Moderate exercise is safe for most people, but it is always a good idea to talk to your doctor before starting an exercise program.  · Keep moving. Jose Cochran the lawn, work in the garden, or [employer]. Take the stairs instead of the elevator at work. · If you smoke, quit. People who smoke have an increased risk for heart attack, stroke, cancer, and other lung illnesses. Quitting is hard, but there are ways to boost your chance of quitting tobacco for good. ? Use nicotine gum, patches, or lozenges. ? Ask your doctor about stop-smoking programs and medicines. ? Keep trying. In addition to reducing your risk of diseases in the future, you will notice some benefits soon after you stop using tobacco. If you have shortness of breath or asthma symptoms, they will likely get better within a few weeks after you quit. · Limit how much alcohol you drink. Moderate amounts of alcohol (up to 2 drinks a day for men, 1 drink a day for women) are okay. But drinking too much can lead to liver problems, high blood pressure, and other health problems. Family health  If you have a family, there are many things you can do together to improve your health. · Eat meals together as a family as often as possible. · Eat healthy foods. This includes fruits, vegetables, lean meats and dairy, and whole grains. · Include your family in your fitness plan. Most people think of activities such as jogging or tennis as the way to fitness, but there are many ways you and your family can be more active. Anything that makes you breathe hard and gets your heart pumping is exercise. Here are some tips:  ? Walk to do errands or to take your child to school or the bus.  ? Go for a family bike ride after dinner instead of watching TV. Where can you learn more? Go to http://clarence-woo.info/. Enter C852 in the search box to learn more about \"A Healthy Lifestyle: Care Instructions. \"  Current as of: September 11, 2018  Content Version: 11.9  © 0789-9368 [employer], [employer]. Care instructions adapted under license by [employer] (which disclaims liability or warranty for this information). If you have questions about a medical condition or this instruction, always ask your healthcare professional. [address] any warranty or liability for your use of this information. Office Policies    o Phone calls/patient messages:  Please allow up to 24 hours for someone in the office to contact you about your call or message. Be mindful your provider may be out of the office or your message may require further review. We encourage you to use "[employer]" for your messages as this is a faster, more efficient way to communicate with our office    o Medication Refills:  Prescription medications require up to 48 business hours to process. We encourage you to use "[employer]" for your refills. For controlled medications: Please allow up to 72 business hours to process. Certain medications may require you to  a written prescription at our office. NO narcotic/controlled medications will be prescribed after 4pm Monday through Friday or on weekends    o Form/Paperwork Completion:  We ask that you allow 7-14 business days. You may also download your forms to "[employer]" to have your doctor print off.

## 2019-06-05 NOTE — PROGRESS NOTES
Consult    Subjective:     Skyler Black is a 44 y. o.R handed white female seen for evaluation at the request of Dr. Chandu Jeong for new problem running out of her medication for migraines, and for evaluation of her migraine headaches. She still has headaches, but is maintaining control with maxalt p.r.n. she discontinue the Topamax last visit a year and a half ago, because of feeling sedated and slow on the medication, and did not notice any change in the frequency of headaches. She did not notice some increased weight gain. She still has fairly frequent tension type headaches with occasional 3-4 more severe migraine headaches that require the Maxalt. She has almost daily headaches of tension type, we discussed the new medications as far as the CGRP inhibitors, Botox, or other preventive medications, but she would prefer at this time just to take her Maxalt as needed and use over-the-counter medication for the more tension type headaches. She has had no new focal weakness no new sensory symptoms no meningismus no fever, no trauma, and no other precipitating cause for her headaches other than stress and tension. We did encourage her to try to get more physically and mentally active which may also help the headaches. Preventive medications that were more holistic, including magnesium oxide 400 mg a day and coenzyme Q 1000 mg a day which the patient will try to see if they will help prevent her headaches. 20 minutes out of the 35 minutes spent counseling the patient on headaches, preventive medicines, and treatment options. Stress is the most common precipitating factor for her headaches. On current medication she is definitely improved from her previous history. Headaches are bilateral throbbing associated with nausea blurred vision and photophobia and she has to lie down in dark place and sleep if severe.  She has had normal MRI of brain in past and has had no new focal weakness sensory loss or visual changes or other new neuro problems  Her neck pain is stable with exercise and OTC meds. She has had no unusual fever, head trauma, meningismus, new focal weakness or sensory loss, visual changes gait changes or cognitive problems, and denies any increased stress anxiety or tension. Complete review of systems and symptoms was negative for any new medical problems or illnesses recently. Past Medical History:   Diagnosis Date    Headache(784.0)       Past Surgical History:   Procedure Laterality Date    HX HYSTERECTOMY       Family History   Problem Relation Age of Onset   [facility] Cancer Mother     Heart Disease Father     Diabetes Father     Migraines Sister       Social History     Tobacco Use    Smoking status: Current Every Day Smoker     Years: 5.00     Types: Cigarettes    Smokeless tobacco: Never Used    Tobacco comment: currently trying to quit, down to 3-5 cigarettes per day   Substance Use Topics    Alcohol use: No       Current Outpatient Medications   Medication Sig Dispense Refill    co-enzyme Q-10 (COQ-10) 100 mg capsule Take 300 mg by mouth daily.  magnesium oxide (MAG-OX) 400 mg tablet Take 400 mg by mouth daily.  rizatriptan (MAXALT) 10 mg tablet Take 1 Tab by mouth every eight (8) hours as needed for Migraine. 36 Tab 4    OTHER,NON-FORMULARY, [employer] Supplement      fexofenadine (ALLEGRA) 180 mg tablet Take 180 mg by mouth daily as needed.  cholecalciferol, vitamin D3, (VITAMIN D3) 2,000 unit tab Take  by mouth.  b complex vitamins (B COMPLEX 1) tablet Take 1 Tab by mouth daily.  Cetirizine (ZYRTEC) 10 mg cap Take  by mouth as needed.           No Known Allergies     Review of Systems:  A comprehensive review of systems was negative except for: Constitutional: positive for fatigue and malaise  Musculoskeletal: positive for myalgias, arthralgias and stiff joints  Neurological: positive for headaches and paresthesia  Behvioral/Psych: positive for anxiety and depression Objective:     I      NEUROLOGICAL EXAM:    Appearance: The patient is well developed, well nourished, mildly overweight, provides a coherent history and is in no acute distress. Mental Status: Oriented to time, place and person and the president, cognitive function is normal and speech is fluent without aphasia. Mood and affect appropriate. Cranial Nerves:   Intact visual fields. Fundi are benign, discs are flat. ADELAIDA, EOM's full, no nystagmus, no ptosis. Facial sensation is normal. Corneal reflexes are into tested. Facial movement is symmetric. Hearing is normal bilaterally. Palate is midline with normal sternocleidomastoid and trapezius muscles are normal. Tongue is midline. Neck is supple without bruits or meningismus  Temporal arteries are not tender or enlarged  TMJ areas are not tender   Motor:  5/5 strength in upper and lower proximal and distal muscles. Normal bulk and tone. No fasciculations. Rapid altering movement is symmetric bilaterally   Reflexes:   Deep tendon reflexes 2+/4 and symmetrical.  No Babinski or clonus present   Sensory:   Normal to touch, pinprick and vibration and temperature. DSS is intact   Gait:  Normal gait. Tremor:   No tremor noted. Cerebellar:  No cerebellar signs present on finger-nose-finger exam, and minimally abnormal Romberg and tandem. Neurovascular:  Normal heart sounds and regular rhythm, peripheral pulses intact, and no carotid bruits.            Assessment:     Plan:     Migraine still persistent, but no worse off Topamax, and numerous preventive medications including CGRP inhibitors and other prophylactic agents discussed with the patient in detail, but she would prefer to try magnesium oxide 400 mg a day, and coenzyme Q 10 300 mg a day rather than prescription drugs for headache prevention at this time so we gave her doses to take of those and discussed the American urological Association recommendations that these medicines be tried as they have both studies showing their efficacy. 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 Neck pain and spondylosis stable on current treatment  Her medications were reviewed and renewed today for the patient. Patient doing well on current therapy, no other changes will be made  Pt told to start exercise and take MVI and her Vit D more regularly   Pt told to quit smoking  Patient will call if any problem in the interim  Follow up in 12months or earlier if needed    Signed By: Warren Boast, MD     June 5, 2019       This note will not be viewable in [address]

## 2020-04-28 DIAGNOSIS — M47.812 CERVICAL SPONDYLOSIS WITHOUT MYELOPATHY: ICD-10-CM

## 2020-04-28 DIAGNOSIS — G43.109 MIGRAINE WITH AURA AND WITHOUT STATUS MIGRAINOSUS, NOT INTRACTABLE: ICD-10-CM

## 2020-04-28 RX ORDER — RIZATRIPTAN BENZOATE 10 MG/1
TABLET ORAL
Qty: 36 TAB | Refills: 7 | Status: SHIPPED | OUTPATIENT
Start: 2020-04-28 | End: 2021-05-20

## 2020-06-10 ENCOUNTER — VIRTUAL VISIT (OUTPATIENT)
Dept: NEUROLOGY | Age: 50
End: 2020-06-10

## 2020-06-10 DIAGNOSIS — G43.109 MIGRAINE WITH AURA AND WITHOUT STATUS MIGRAINOSUS, NOT INTRACTABLE: ICD-10-CM

## 2020-06-10 DIAGNOSIS — M47.812 CERVICAL SPONDYLOSIS WITHOUT MYELOPATHY: Primary | ICD-10-CM

## 2020-06-10 RX ORDER — ECHINACEA 400 MG
CAPSULE ORAL
COMMUNITY

## 2020-06-10 NOTE — PROGRESS NOTES
Consult    Subjective:     Neil Beck is a 44 y. o.R handed white female seen for evaluation at the request of Dr. Cem Hammonds for problem of headaches for which she takes Maxalt with fairly good response that was renewed for her today also. Headaches are persistent but not quite as bad as it used to be. Patient has had no new neurologic problems and no new physical problems in the interim. He is trying to stay active and healthy. She was on Topamax but was drowsy, stop the Topamax, the headaches are stable, she has had some weight gain. She still has fairly frequent tension type headaches with occasional 3-4 more severe migraine headaches that require the Maxalt. She has almost daily headaches of tension type, we discussed the new medications as far as the CGRP inhibitors, Botox, or other preventive medications, but she would prefer at this time just to take her Maxalt as needed and use over-the-counter medication for the more tension type headaches. She has had no new focal weakness no new sensory symptoms no meningismus no fever, no trauma, and no other precipitating cause for her headaches other than stress and tension. We did encourage her to try to get more physically and mentally active which may also help the headaches. Preventive medications that were more holistic, including magnesium oxide 400 mg a day and coenzyme Q 1000 mg a day which the patient will try to see if they will help prevent her headaches. 20 minutes out of the 35 minutes spent counseling the patient on headaches, preventive medicines, and treatment options. Stress is the most common precipitating factor for her headaches. On current medication she is definitely improved from her previous history. Headaches are bilateral throbbing associated with nausea blurred vision and photophobia and she has to lie down in dark place and sleep if severe.  She has had normal MRI of brain in past and has had no new focal weakness sensory loss or visual changes or other new neuro problems  Her neck pain is stable with exercise and OTC meds. She has had no unusual fever, head trauma, meningismus, new focal weakness or sensory loss, visual changes gait changes or cognitive problems, and denies any increased stress anxiety or tension. Complete review of systems and symptoms was negative for any new medical problems or illnesses recently. Past Medical History:   Diagnosis Date    Headache(784.0)       Past Surgical History:   Procedure Laterality Date    HX HYSTERECTOMY       Family History   Problem Relation Age of Onset   [place] [facility] Mother     Heart Disease Father     Diabetes Father     Migraines Sister     Cancer Sister       Social History     Tobacco Use    Smoking status: Current Every Day Smoker     Years: 5.00     Types: Cigarettes    Smokeless tobacco: Never Used    Tobacco comment: currently trying to quit, down to 3-5 cigarettes per day   Substance Use Topics    Alcohol use: No       Current Outpatient Medications   Medication Sig Dispense Refill    elderberry fruit and flower 460-115 mg cap Take  by mouth.  rizatriptan (MAXALT) 10 mg tablet TAKE 1 TABLET EVERY 8 HOURS AS NEEDED FOR MIGRAINE 36 Tab 7    co-enzyme Q-10 (COQ-10) 100 mg capsule Take 300 mg by mouth daily.  magnesium oxide (MAG-OX) 400 mg tablet Take 400 mg by mouth daily.  OTHER,NON-FORMULARY, [employer] Supplement      fexofenadine (ALLEGRA) 180 mg tablet Take 180 mg by mouth daily as needed.  cholecalciferol, vitamin D3, (VITAMIN D3) 2,000 unit tab Take  by mouth.  b complex vitamins (B COMPLEX 1) tablet Take 1 Tab by mouth daily.  Cetirizine (ZYRTEC) 10 mg cap Take  by mouth as needed.           No Known Allergies     Review of Systems:  A comprehensive review of systems was negative except for: Constitutional: positive for fatigue and malaise  Musculoskeletal: positive for myalgias, arthralgias and stiff joints  Neurological: positive for headaches and paresthesia  Behvioral/Psych: positive for anxiety and depression     Objective:     I      NEUROLOGICAL EXAM:    Appearance: The patient is well developed, well nourished, mildly overweight, provides a coherent history and is in no acute distress. Mental Status: Oriented to time, place and person and the president, cognitive function is normal and speech is fluent without aphasia. Mood and affect appropriate. Cranial Nerves:   Intact visual fields. Fundi are not testable ADELAIDA, EOM's full, no nystagmus, no ptosis. Facial sensation is normal. Corneal reflexes are into tested. Facial movement is symmetric. Hearing is normal bilaterally. Palate is midline with normal sternocleidomastoid and trapezius muscles are normal. Tongue is midline. Neck is supple without bruits or meningismus  Temporal arteries are not tender or enlarged  TMJ areas are not tender   Motor:  5/5 strength in upper and lower proximal and distal muscles. Normal bulk and tone. No fasciculations. Rapid altering movement is symmetric bilaterally   Reflexes:    Not testable   Sensory:   Normal to touch, pinprick and vibration and temperature and DSS not tested. Normal   Gait:  Normal gait. Tremor:   No tremor noted. Cerebellar:  No cerebellar signs present on finger-nose-finger exam, and minimally abnormal Romberg and tandem.    Neurovascular:   Not testable cardiac exam, carotid artery exam and peripheral artery exam           Assessment:     Plan:     Migraine still persistent, but no worse off Topamax, and numerous preventive medications including CGRP inhibitors and other prophylactic agents discussed with the patient in detail, but she would prefer to try magnesium oxide 400 mg a day, and coenzyme Q 10 300 mg a day rather than prescription drugs for headache prevention at this time so we gave her doses to take of those and discussed the American urological Association recommendations that these medicines be tried as they have both studies showing their efficacy. 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 Neck pain and spondylosis stable on current treatment  Her medications were reviewed and renewed today for the patient. Patient doing well on current therapy, no other changes will be made  Pt told to start exercise and take MVI and her Vit D more regularly   Pt told to quit smoking  Patient will call if any problem in the interim  Follow up in 12months or earlier if needed    Signed By: Maxim Urrutia MD     Anabell 10, 2020       Melissa Warner was seen by synchronous (real-time) audio-video technology on 06/10/20. Consent:  She and/or her healthcare decision maker is aware that this patient-initiated Telehealth encounter is a billable service, with coverage as determined by her insurance carrier. She is aware that she may receive a bill and has provided verbal consent to proceed: Yes    I was in the office while conducting this encounter. Pursuant to the emergency declaration under the [facility], [facility]5 waiver authority and the [employer] and [employer] Act, this Virtual  Visit was conducted, with patient's consent, to reduce the patient's risk of exposure to COVID-19 and provide continuity of care for an established patient. Services were provided through a video synchronous discussion virtually to substitute for in-person clinic visit. This note will not be viewable in [address]

## 2021-05-20 DIAGNOSIS — G43.109 MIGRAINE WITH AURA AND WITHOUT STATUS MIGRAINOSUS, NOT INTRACTABLE: ICD-10-CM

## 2021-05-20 DIAGNOSIS — M47.812 CERVICAL SPONDYLOSIS WITHOUT MYELOPATHY: ICD-10-CM

## 2021-05-20 RX ORDER — RIZATRIPTAN BENZOATE 10 MG/1
TABLET ORAL
Qty: 36 TABLET | Refills: 7 | Status: SHIPPED | OUTPATIENT
Start: 2021-05-20 | End: 2022-05-26

## 2022-03-19 PROBLEM — E66.01 SEVERE OBESITY (HCC): Status: ACTIVE | Noted: 2019-06-05

## 2022-05-26 DIAGNOSIS — M47.812 CERVICAL SPONDYLOSIS WITHOUT MYELOPATHY: ICD-10-CM

## 2022-05-26 DIAGNOSIS — G43.109 MIGRAINE WITH AURA AND WITHOUT STATUS MIGRAINOSUS, NOT INTRACTABLE: ICD-10-CM

## 2022-05-26 RX ORDER — RIZATRIPTAN BENZOATE 10 MG/1
TABLET ORAL
Qty: 36 TABLET | Refills: 7 | Status: SHIPPED | OUTPATIENT
Start: 2022-05-26

## 2022-08-25 ENCOUNTER — TRANSCRIBE ORDER (OUTPATIENT)
Dept: SCHEDULING | Age: 52
End: 2022-08-25

## 2022-08-25 DIAGNOSIS — Z12.31 VISIT FOR SCREENING MAMMOGRAM: Primary | ICD-10-CM

## 2022-09-06 ENCOUNTER — HOSPITAL ENCOUNTER (OUTPATIENT)
Dept: MAMMOGRAPHY | Age: 52
Discharge: HOME | End: 2022-09-06
Attending: OBSTETRICS & GYNECOLOGY
Payer: COMMERCIAL

## 2022-09-06 DIAGNOSIS — Z12.31 VISIT FOR SCREENING MAMMOGRAM: ICD-10-CM

## 2022-09-06 PROCEDURE — 77067 SCR MAMMO BI INCL CAD: CPT

## 2023-04-06 ENCOUNTER — OFFICE VISIT (OUTPATIENT)
Dept: NEUROLOGY | Age: 53
End: 2023-04-06
Payer: COMMERCIAL

## 2023-04-06 PROCEDURE — 99214 OFFICE O/P EST MOD 30 MIN: CPT | Performed by: PSYCHIATRY & NEUROLOGY

## 2023-04-06 NOTE — PROGRESS NOTES
Consult    Subjective:     Jose Peña is a 52 y. o.R handed white female seen for evaluation at the request of Dr. Kane Garcia for problem of headaches for which she takes Maxalt with fairly good response but patient does have headaches that are not completely relieved by the Maxalt and just repeat her dose 2 or 3 times occasionally, and because of this we will try her on Ubrelvy for her headaches which may be more effective and prevent redosing of the medication and shorten the duration and severity of her headaches. The risk and benefit of the medication all explained to the patient in general, and the only side effect of the 50 mg dose was a 1% increase in sedation over placebo but no other side effects other than placebo side effects. We explained the risk and benefits of the medication to her in detail and she is going to try the medication and the new prescription sent in for her today. The Maxalt was renewed for her today also. Headaches are persistent but not quite as bad as it used to be. Patient has had no new neurologic problems and no new physical problems in the interim. She is trying to stay active and healthy. She was on Topamax but was drowsy, and she stopped the Topamax, the headaches are stable, she has had some weight gain. She still has fairly frequent tension type headaches with occasional 3-4 more severe migraine headaches that require the Maxalt. She also took Imitrex in the past without relief of her headaches. Therefore she has failed two triptans. She has had no new focal weakness no new sensory symptoms no meningismus no fever, no trauma, and no other precipitating cause for her headaches other than stress and tension. We did encourage her to try to get more physically and mentally active which may also help the headaches.   Preventive medications that were more holistic, including magnesium oxide 400 mg a day and coenzyme Q 1000 mg a day which the patient will try to see if they will help prevent her headaches. 20 minutes out of the 33 minutes spent counseling the patient on headaches, new medications of Ubrelvy and preventive medicines, and treatment options. Stress is the most common precipitating factor for her headaches. On current medication she is definitely improved from her previous history. Headaches are bilateral throbbing associated with nausea blurred vision and photophobia and she has to lie down in dark place and sleep if severe. She has had normal MRI of brain in past and has had no new focal weakness sensory loss or visual changes or other new neuro problems  Her neck pain is stable with exercise and OTC meds. She has had no unusual fever, head trauma, meningismus, new focal weakness or sensory loss, visual changes gait changes or cognitive problems, and denies any increased stress anxiety or tension. Complete review of systems and symptoms was negative for any new medical problems or illnesses recently. Past Medical History:   Diagnosis Date    Headache(784.0)       Past Surgical History:   Procedure Laterality Date    HX HYSTERECTOMY       Family History   Problem Relation Age of Onset    Cancer Mother     Migraines Sister     Cancer Sister     Breast Cancer Paternal Aunt     Heart Disease Father     Diabetes Father       Social History     Tobacco Use    Smoking status: Every Day     Packs/day: 1.00     Years: 15.00     Pack years: 15.00     Types: Cigarettes    Smokeless tobacco: Never    Tobacco comments:     currently trying to quit, down to 3-5 cigarettes per day   Substance Use Topics    Alcohol use: No       Current Outpatient Medications   Medication Sig Dispense Refill    APPLE CIDER VINEGAR PO Take 4 Tablets by mouth daily.       rizatriptan (MAXALT) 10 mg tablet TAKE 1 TABLET EVERY 8 HOURS AS NEEDED FOR MIGRAINE 36 Tablet 7    ubrogepant (Ubrelvy) 50 mg tablet 1 PO att onset of headache and can repeat in 2 hours if needed, max dose 2 per day 48 Tablet 5    elderberry fruit and flower 460-115 mg cap Take  by mouth. co-enzyme Q-10 (CO Q-10) 100 mg capsule Take 3 Capsules by mouth daily. magnesium oxide (MAG-OX) 400 mg tablet Take 1 Tablet by mouth daily. OTHER,NON-FORMULARY, True Vision Supplement      cholecalciferol, vitamin D3, 50 mcg (2,000 unit) tab Take  by mouth.      b complex vitamins tablet Take 1 Tablet by mouth daily. Cetirizine 10 mg cap Take  by mouth as needed. No Known Allergies     Review of Systems:  A comprehensive review of systems was negative except for: Constitutional: positive for fatigue and malaise  Musculoskeletal: positive for myalgias, arthralgias and stiff joints  Neurological: positive for headaches and paresthesia  Behvioral/Psych: positive for anxiety and depression     Objective:     I      NEUROLOGICAL EXAM:    Appearance: The patient is well developed, well nourished, mildly overweight, provides a coherent history and is in no acute distress. Mental Status: Oriented to time, place and person and the president, cognitive function is normal and speech is fluent without aphasia. Mood and affect appropriate. Cranial Nerves:   Intact visual fields. Fundi are not testable ADELAIDA, EOM's full, no nystagmus, no ptosis. Facial sensation is normal. Corneal reflexes are into tested. Facial movement is symmetric. Hearing is normal bilaterally. Palate is midline with normal sternocleidomastoid and trapezius muscles are normal. Tongue is midline. Neck is supple without bruits or meningismus  Temporal arteries are not tender or enlarged  TMJ areas are not tender   Motor:  5/5 strength in upper and lower proximal and distal muscles. Normal bulk and tone. No fasciculations. Rapid altering movement is symmetric bilaterally   Reflexes:    Not testable   Sensory:   Normal to touch, pinprick and vibration and temperature and DSS not tested. Normal   Gait:  Normal gait. Tremor:   No tremor noted.    Cerebellar:  No cerebellar signs present on finger-nose-finger exam, and minimally abnormal Romberg and tandem. Neurovascular:   Not testable cardiac exam, carotid artery exam and peripheral artery exam           Assessment:     Plan:     Migraine still persistent, but no worse off Topamax, and do not always respond to the Maxalt, and did not respond to Imitrex in the past, therefore we will try her on Ubrelvy to see if that can be more effective in relieving her headaches now, and she does not seem to need a preventive agent at this time because she failed Topamax and amitriptyline in the past, and her headaches are not quite as frequently as they used to be.  the patient is to continue the magnesium oxide 400 mg a day, and coenzyme Q 10 300 mg a day rather than prescription drugs for headache prevention at this time so we gave her doses to take of those and discussed the American urological Association recommendations that these medicines be tried as they have both studies showing their efficacy. 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 Neck pain and spondylosis stable on current treatment  Her medications were reviewed and renewed today for the patient. Patient doing well on current therapy, no other changes will be made  Pt told to start exercise and take MVI and her Vit D more regularly   Pt told to quit smoking  Time of visit was 33 minutes going over the new medications, the risk and benefits, the indications, and stressing to her that she needs also to stay physically mentally active, exercise regular, eat a Mediterranean type diet to help her lose weight and that the Ubrelvy does not have weight gain so she is going to try that. We also renewed the Maxalt for her and could consider Bo Cormier as a preventive agent in the future.   Patient will call if any problem in the interim  Follow up in 12months or earlier if needed    Signed By: Ivy Reyes MD     April 6, 2023

## 2023-04-06 NOTE — PROGRESS NOTES
Chief Complaint   Patient presents with    Migraine     Was getting every day couple weeks ago - weather makes a difference      1. Have you been to the ER, urgent care clinic since your last visit? Hospitalized since your last visit? No     2. Have you seen or consulted any other health care providers outside of the [address] [place] [address] since your last visit? Include any pap smears or colon screening.   Yes [facility] and [facility]yn

## 2023-04-06 NOTE — LETTER
4/6/2023 10:47 AM    Patient:  Rafy Gardiner   YOB: 1970  Date of Visit: 4/6/2023      Dear   No Recipients: Thank you for referring Ms. Rafy Gardiner to me for evaluation/treatment. Below are the relevant portions of my assessment and plan of care. If you have questions, please do not hesitate to call me. I look forward to following Ms. Erna Jacome along with you.         Sincerely,      Pooja Henao MD

## 2023-10-02 ENCOUNTER — TRANSCRIBE ORDERS (OUTPATIENT)
Facility: HOSPITAL | Age: 53
End: 2023-10-02

## 2023-10-02 DIAGNOSIS — Z12.31 VISIT FOR SCREENING MAMMOGRAM: Primary | ICD-10-CM

## 2023-10-11 ENCOUNTER — HOSPITAL ENCOUNTER (OUTPATIENT)
Facility: HOSPITAL | Age: 53
Discharge: HOME | End: 2023-10-14
Attending: OBSTETRICS & GYNECOLOGY
Payer: COMMERCIAL

## 2023-10-11 VITALS — BODY MASS INDEX: 39.32 KG/M2 | WEIGHT: 236 LBS | HEIGHT: 65 IN

## 2023-10-11 DIAGNOSIS — Z12.31 VISIT FOR SCREENING MAMMOGRAM: ICD-10-CM

## 2023-10-11 PROCEDURE — 77067 SCR MAMMO BI INCL CAD: CPT

## 2023-10-26 ENCOUNTER — APPOINTMENT (OUTPATIENT)
Facility: HOSPITAL | Age: 53
End: 2023-10-26
Payer: COMMERCIAL

## 2023-10-26 ENCOUNTER — HOSPITAL ENCOUNTER (EMERGENCY)
Facility: HOSPITAL | Age: 53
Discharge: HOME | End: 2023-10-26
Attending: EMERGENCY MEDICINE
Payer: COMMERCIAL

## 2023-10-26 VITALS
DIASTOLIC BLOOD PRESSURE: 115 MMHG | BODY MASS INDEX: 39.56 KG/M2 | WEIGHT: 231.7 LBS | HEART RATE: 89 BPM | HEIGHT: 64 IN | SYSTOLIC BLOOD PRESSURE: 143 MMHG | TEMPERATURE: 97.9 F | OXYGEN SATURATION: 98 % | RESPIRATION RATE: 16 BRPM

## 2023-10-26 DIAGNOSIS — S39.012A STRAIN OF LUMBAR REGION, INITIAL ENCOUNTER: Primary | ICD-10-CM

## 2023-10-26 LAB
APPEARANCE UR: ABNORMAL
BACTERIA URNS QL MICRO: ABNORMAL /HPF
BILIRUB UR QL: NEGATIVE
COLOR UR: ABNORMAL
EPITH CASTS URNS QL MICRO: ABNORMAL /LPF
GLUCOSE UR STRIP.AUTO-MCNC: NEGATIVE MG/DL
HGB UR QL STRIP: ABNORMAL
KETONES UR QL STRIP.AUTO: ABNORMAL MG/DL
LEUKOCYTE ESTERASE UR QL STRIP.AUTO: NEGATIVE
NITRITE UR QL STRIP.AUTO: NEGATIVE
PH UR STRIP: 5.5 (ref 5–8)
PROT UR STRIP-MCNC: NEGATIVE MG/DL
RBC #/AREA URNS HPF: ABNORMAL /HPF (ref 0–5)
SP GR UR REFRACTOMETRY: 1.02
URINE CULTURE IF INDICATED: ABNORMAL
UROBILINOGEN UR QL STRIP.AUTO: 0.2 EU/DL (ref 0.2–1)
WBC URNS QL MICRO: ABNORMAL /HPF (ref 0–4)

## 2023-10-26 PROCEDURE — 74176 CT ABD & PELVIS W/O CONTRAST: CPT

## 2023-10-26 PROCEDURE — 99284 EMERGENCY DEPT VISIT MOD MDM: CPT

## 2023-10-26 PROCEDURE — 6360000002 HC RX W HCPCS: Performed by: EMERGENCY MEDICINE

## 2023-10-26 PROCEDURE — 6370000000 HC RX 637 (ALT 250 FOR IP): Performed by: EMERGENCY MEDICINE

## 2023-10-26 PROCEDURE — 96372 THER/PROPH/DIAG INJ SC/IM: CPT

## 2023-10-26 PROCEDURE — 81001 URINALYSIS AUTO W/SCOPE: CPT

## 2023-10-26 RX ORDER — KETOROLAC TROMETHAMINE 30 MG/ML
30 INJECTION, SOLUTION INTRAMUSCULAR; INTRAVENOUS ONCE
Status: COMPLETED | OUTPATIENT
Start: 2023-10-26 | End: 2023-10-26

## 2023-10-26 RX ORDER — ACETAMINOPHEN 500 MG
1000 TABLET ORAL
Status: COMPLETED | OUTPATIENT
Start: 2023-10-26 | End: 2023-10-26

## 2023-10-26 RX ORDER — IBUPROFEN 600 MG/1
600 TABLET ORAL EVERY 6 HOURS PRN
Qty: 28 TABLET | Refills: 0 | Status: SHIPPED | OUTPATIENT
Start: 2023-10-26 | End: 2023-11-02

## 2023-10-26 RX ORDER — CYCLOBENZAPRINE HCL 5 MG
5 TABLET ORAL 3 TIMES DAILY PRN
Qty: 20 TABLET | Refills: 0 | Status: SHIPPED | OUTPATIENT
Start: 2023-10-26 | End: 2023-11-02

## 2023-10-26 RX ORDER — OXYCODONE HYDROCHLORIDE AND ACETAMINOPHEN 5; 325 MG/1; MG/1
1 TABLET ORAL EVERY 6 HOURS PRN
Qty: 12 TABLET | Refills: 0 | Status: SHIPPED | OUTPATIENT
Start: 2023-10-26 | End: 2023-10-29

## 2023-10-26 RX ORDER — PREDNISONE 10 MG/1
TABLET ORAL
Qty: 30 TABLET | Refills: 0 | Status: SHIPPED | OUTPATIENT
Start: 2023-10-26 | End: 2023-11-07

## 2023-10-26 RX ADMIN — KETOROLAC TROMETHAMINE 30 MG: 30 INJECTION, SOLUTION INTRAMUSCULAR; INTRAVENOUS at 12:23

## 2023-10-26 RX ADMIN — ACETAMINOPHEN 1000 MG: 500 TABLET ORAL at 12:23

## 2023-10-26 ASSESSMENT — PAIN DESCRIPTION - LOCATION: LOCATION: BACK

## 2023-10-26 ASSESSMENT — LIFESTYLE VARIABLES
HOW OFTEN DO YOU HAVE A DRINK CONTAINING ALCOHOL: MONTHLY OR LESS
HOW MANY STANDARD DRINKS CONTAINING ALCOHOL DO YOU HAVE ON A TYPICAL DAY: 1 OR 2

## 2023-10-26 ASSESSMENT — PAIN - FUNCTIONAL ASSESSMENT: PAIN_FUNCTIONAL_ASSESSMENT: 0-10

## 2023-10-26 ASSESSMENT — PAIN SCALES - GENERAL: PAINLEVEL_OUTOF10: 7

## 2023-10-26 NOTE — ED PROVIDER NOTES
\[facility]\"" EMERGENCY DEPT  EMERGENCY DEPARTMENT ENCOUNTER       Pt Name: Stephy Jerry  MRN: [id number]  [address] [facility] 1970  Date of evaluation: 10/26/2023  Provider: Reggie Nelson MD   PCP: [facility]  Note Started: 2:14 PM EDT 10/26/23     CHIEF COMPLAINT       Chief Complaint   Patient presents with    Back Pain     Pt reports bilateral lower back pain that started Sunday. She has been seen at [facility] twice this week and they have not been able to tell her what's wrong. HISTORY OF PRESENT ILLNESS: 1 or more elements      History From: Patient  HPI Limitations: None     Stephy Jerry is a 52 y.o. female who presents with complaints of lower back pain has been severe over the last 4 days. Is atraumatic, does not radiate, not associate with weakness, numbness, loss of sensation, incontinence, fevers or chills. Patient has no history of cancer or IV drug use. Patient is trying some muscle relaxants anti-inflammatories with no improvement. Nursing Notes were all reviewed and agreed with or any disagreements were addressed in the HPI. REVIEW OF SYSTEMS      Review of Systems     Positives and Pertinent negatives as per HPI.     PAST HISTORY     Past Medical History:  Past Medical History:   Diagnosis Date    Headache(784.0)          Past Surgical History:  Past Surgical History:   Procedure Laterality Date    HYSTERECTOMY (CERVIX STATUS UNKNOWN)         Family History:  Family History   Problem Relation Age of Onset    Cancer Sister     Cancer Mother     Breast Cancer Paternal Aunt     Migraines Sister     Diabetes Father     Heart Disease Father        Social History:  Social History     Tobacco Use    Smoking status: Every Day     Packs/day: 1     Types: Cigarettes    Smokeless tobacco: Never   Substance Use Topics    Alcohol use: No    Drug use: No       Allergies:  No Known Allergies    CURRENT MEDICATIONS      Discharge Medication List as of 10/26/2023  2:12 PM        CONTINUE these medications

## 2023-10-26 NOTE — DISCHARGE INSTRUCTIONS
It was a pleasure taking care of you at [facility] Emergency Department today. We know that when you come to [address] [place] [address], you are entrusting us with your health, comfort, and safety. Our physicians and nurses honor that trust, and we truly appreciate the opportunity to care for you and your loved ones. We also value your feedback. If you receive a survey about your Emergency Department experience today, please fill it out. We care about our patients' feedback, and we listen to what you have to say. Thank you!

## 2024-04-22 RX ORDER — RIZATRIPTAN BENZOATE 10 MG/1
TABLET ORAL
Qty: 36 TABLET | Refills: 7 | OUTPATIENT
Start: 2024-04-22

## 2024-08-05 ENCOUNTER — OFFICE VISIT (OUTPATIENT)
Age: 54
End: 2024-08-05
Payer: COMMERCIAL

## 2024-08-05 VITALS
WEIGHT: 240 LBS | RESPIRATION RATE: 19 BRPM | BODY MASS INDEX: 39.99 KG/M2 | DIASTOLIC BLOOD PRESSURE: 86 MMHG | TEMPERATURE: 97.3 F | SYSTOLIC BLOOD PRESSURE: 138 MMHG | OXYGEN SATURATION: 95 % | HEIGHT: 65 IN | HEART RATE: 93 BPM

## 2024-08-05 DIAGNOSIS — M47.812 CERVICAL SPONDYLOSIS WITHOUT MYELOPATHY: ICD-10-CM

## 2024-08-05 DIAGNOSIS — G43.109 MIGRAINE WITH AURA AND WITHOUT STATUS MIGRAINOSUS, NOT INTRACTABLE: Primary | ICD-10-CM

## 2024-08-05 PROCEDURE — 99214 OFFICE O/P EST MOD 30 MIN: CPT | Performed by: PSYCHIATRY & NEUROLOGY

## 2024-08-05 RX ORDER — RIZATRIPTAN BENZOATE 10 MG/1
10 TABLET ORAL EVERY 12 HOURS PRN
Qty: 36 TABLET | Refills: 4 | Status: SHIPPED | OUTPATIENT
Start: 2024-08-05

## 2024-08-05 RX ORDER — UBROGEPANT 50 MG/1
TABLET ORAL
Qty: 16 TABLET | Refills: 11 | Status: SHIPPED | OUTPATIENT
Start: 2024-08-05 | End: 2024-08-05 | Stop reason: ALTCHOICE

## 2024-08-05 RX ORDER — UBROGEPANT 50 MG/1
TABLET ORAL
Qty: 48 TABLET | Refills: 4 | Status: SHIPPED | OUTPATIENT
Start: 2024-08-05

## 2024-08-05 RX ORDER — UREA 10 %
500 LOTION (ML) TOPICAL DAILY
COMMUNITY

## 2024-08-05 ASSESSMENT — PATIENT HEALTH QUESTIONNAIRE - PHQ9
SUM OF ALL RESPONSES TO PHQ QUESTIONS 1-9: 0
SUM OF ALL RESPONSES TO PHQ QUESTIONS 1-9: 0
SUM OF ALL RESPONSES TO PHQ9 QUESTIONS 1 & 2: 0
2. FEELING DOWN, DEPRESSED OR HOPELESS: NOT AT ALL
1. LITTLE INTEREST OR PLEASURE IN DOING THINGS: NOT AT ALL
SUM OF ALL RESPONSES TO PHQ QUESTIONS 1-9: 0
SUM OF ALL RESPONSES TO PHQ QUESTIONS 1-9: 0

## 2024-08-05 NOTE — PROGRESS NOTES
Consult  REFERRED BY:  [facility] (Inactive)    CHIEF COMPLAINT: Persistent headaches for the last 3 weeks, and not responding to her triptan therapy.      Subjective:     Glenis Roberts is a 54 y.o. right handed white female seen for evaluation at the request of Dr. Wise for new problem of persistent headaches for the last several weeks, and not responding to her triptan therapy of Imitrex and Maxalt, and wanting another medication to try to help control her headaches and avoid preventive medications if possible.  She denies any unusual stress tension anxiety, fever or meningismus or other causes for the headaches.  We will start her on Ubrelvy 50 mg to use as needed for the headaches since she has never tried a CGRP inhibitor in the past.  The risk and benefit of the medication all explained to the patient in general, and the only side effect of the 50 mg dose was a 1% increase in sedation over placebo but no other side effects other than placebo side effects.  We explained the risk and benefits of the medication to her in detail and she is going to try the medication and the new prescription sent in for her today.  The Maxalt was renewed for her today also.  Headaches are persistent and bothersome.  Patient has had no new neurologic problems and no new physical problems in the interim.  She is trying to stay active and healthy.  She was on Topamax but was drowsy, and she stopped the Topamax, the headaches are stable, she has had some weight gain.  She still has fairly frequent tension type headaches with occasional 3-4 more severe migraine headaches that require the Maxalt.  She also took Imitrex in the past without relief of her headaches.  Therefore she has failed two triptans.  She has had no new focal weakness no new sensory symptoms no meningismus no fever, no trauma, and no other precipitating cause for her headaches other than stress and tension.  We did encourage her to try to get more physically and

## 2024-08-06 ENCOUNTER — TELEPHONE (OUTPATIENT)
Age: 54
End: 2024-08-06

## 2024-08-07 ENCOUNTER — TELEPHONE (OUTPATIENT)
Age: 54
End: 2024-08-07

## 2024-08-08 ENCOUNTER — TELEPHONE (OUTPATIENT)
Age: 54
End: 2024-08-08

## 2024-08-08 NOTE — TELEPHONE ENCOUNTER
Faxed this corrected note, Rizatriptan refill was due 8-5-24, (Not 8-25-24)  Confirmation received.    Form sent to [facility] for quick scan.      oBnnie Teague LPN  Licensed Nurse     Telephone Encounter     Signed     Encounter Date: 8/8/2024     Signed         Faxed back to [facility] ph # [phone], fax [phone] verifying:     Rizatriptan 10 mg, #12, one po q12h prn migraines  11 refills. (One every 12 hours is correct dose).      Each refill must last 30 days, do not fill early.    Form sent to [facility] for quick scan.

## 2024-08-08 NOTE — TELEPHONE ENCOUNTER
Faxed back to [facility] ph # [phone], fax [phone] verifying:    Rizatriptan 10 mg, #12, one po q12h prn migraines  11 refills. (One every 12 hours is correct dose).     Each refill must last 30 days, do not fill early.  (This refill is not due untl 8-25-24)..    Form sent to [facility] for quick scan.

## 2024-09-05 ENCOUNTER — TRANSCRIBE ORDERS (OUTPATIENT)
Facility: HOSPITAL | Age: 54
End: 2024-09-05

## 2024-09-05 DIAGNOSIS — Z12.31 VISIT FOR SCREENING MAMMOGRAM: Primary | ICD-10-CM

## 2024-10-14 ENCOUNTER — HOSPITAL ENCOUNTER (OUTPATIENT)
Facility: HOSPITAL | Age: 54
Discharge: HOME | End: 2024-10-17
Attending: OBSTETRICS & GYNECOLOGY
Payer: COMMERCIAL

## 2024-10-14 VITALS — BODY MASS INDEX: 40 KG/M2 | HEIGHT: 65 IN | WEIGHT: 240.08 LBS

## 2024-10-14 DIAGNOSIS — Z12.31 VISIT FOR SCREENING MAMMOGRAM: ICD-10-CM

## 2024-10-14 PROCEDURE — 77063 BREAST TOMOSYNTHESIS BI: CPT
